# Patient Record
Sex: MALE | Race: WHITE | Employment: FULL TIME | URBAN - METROPOLITAN AREA
[De-identification: names, ages, dates, MRNs, and addresses within clinical notes are randomized per-mention and may not be internally consistent; named-entity substitution may affect disease eponyms.]

---

## 2018-01-20 ENCOUNTER — HOSPITAL ENCOUNTER (EMERGENCY)
Age: 23
Discharge: HOME | End: 2018-01-20
Attending: EMERGENCY MEDICINE
Payer: COMMERCIAL

## 2018-01-20 VITALS
HEART RATE: 98 BPM | SYSTOLIC BLOOD PRESSURE: 150 MMHG | OXYGEN SATURATION: 99 % | HEIGHT: 70 IN | WEIGHT: 190 LBS | BODY MASS INDEX: 27.2 KG/M2 | DIASTOLIC BLOOD PRESSURE: 89 MMHG | RESPIRATION RATE: 16 BRPM | TEMPERATURE: 97.9 F

## 2018-01-20 DIAGNOSIS — J06.9 ACUTE UPPER RESPIRATORY INFECTION: Primary | ICD-10-CM

## 2018-01-20 LAB
DIRECT EXAM: NORMAL
Lab: NORMAL
SPECIMEN DESCRIPTION: NORMAL
STATUS: NORMAL

## 2018-01-20 PROCEDURE — 99283 EMERGENCY DEPT VISIT LOW MDM: CPT

## 2018-01-20 PROCEDURE — 87804 INFLUENZA ASSAY W/OPTIC: CPT

## 2018-01-20 NOTE — ED PROVIDER NOTES
height is 5' 10\" (1.778 m) and weight is 86.2 kg (190 lb). His oral temperature is 97.9 °F (36.6 °C). His blood pressure is 150/89 (abnormal) and his pulse is 98. His respiration is 16 and oxygen saturation is 99%. Patient is alert and oriented, in no apparent distress. HEENT is atraumatic. Pupils are PERRL at 4 mm. Mucous membranes moist.  Posterior pharynx shows minimal erythema. Clear nasal rhinorrhea appreciated. TMs negative bilaterally. Neck is supple with no lymphadenopathy. No JVD. No meningismus. Heart sounds regular rate and rhythm with no gallops, murmurs, or rubs. Lungs clear, no wheezes, rales or rhonchi. Abdomen: soft, nontender with no pain to palpation. No rebound or guarding. Musculoskeletal exam shows no evidence of trauma. Skin: no rash or edema. Neurological exam reveals cranial nerves 2 through 12 grossly intact. Patient has equal  and normal deep tendon reflexes. Psychiatric: appropriate  Lymphatics.:  No lymphadenopathy. DIFFERENTIAL DIAGNOSIS/ MDM:     URI, influenza    DIAGNOSTIC RESULTS         LABS:  Results for orders placed or performed during the hospital encounter of 01/20/18   Rapid Influenza A/B Antigens   Result Value Ref Range    Specimen Description . NASOPHARYNGEAL SWAB     Special Requests NOT REPORTED     Direct Exam PRESUMPTIVE NEGATIVE for Influenza A + B antigens. Direct Exam       PCR testing to confirm this result is available upon request.  Specimen will be    Direct Exam        saved in the laboratory for 7 days. Please call [phone] if PCR testing is    Direct Exam  indicated.      Direct Exam       Performed at [facility] and [facility], [address] [place] [address], [facility].     Status FINAL 01/20/2018          EMERGENCY DEPARTMENT COURSE:   Vitals:    Vitals:    01/20/18 1132   BP: (!) 150/89   Pulse: 98   Resp: 16   Temp: 97.9 °F (36.6 °C)   TempSrc: Oral   SpO2: 99% Weight: 86.2 kg (190 lb)   Height: 5' 10\" (1.778 m)     -------------------------  BP: (!) 150/89, Temp: 97.9 °F (36.6 °C), Pulse: 98, Resp: 16      Re-evaluation Notes    The patient may use over-the-counter cold remedies as directed. He should return for difficulty breathing or swallowing, or if worse in any way. FINAL IMPRESSION      1.  Acute upper respiratory infection          DISPOSITION/PLAN   DISPOSITION Decision To Discharge 01/20/2018 11:58:51 AM      Condition on Disposition    good    PATIENT REFERRED TO:  your doctor    In 1 week  As needed      DISCHARGE MEDICATIONS:  New Prescriptions    No medications on file       (Please note that portions of this note were completed with a voice recognition program.  Efforts were made to edit the dictations but occasionally words are mis-transcribed.)    Duron MD   Attending Emergency Physician         Mariam Cuellar MD  01/20/18 1200